# Patient Record
Sex: MALE | URBAN - METROPOLITAN AREA
[De-identification: names, ages, dates, MRNs, and addresses within clinical notes are randomized per-mention and may not be internally consistent; named-entity substitution may affect disease eponyms.]

---

## 2021-03-26 ENCOUNTER — OFFICE VISIT (OUTPATIENT)
Dept: URBAN - METROPOLITAN AREA CLINIC 16 | Facility: CLINIC | Age: 76
End: 2021-03-26
Payer: COMMERCIAL

## 2021-03-26 PROCEDURE — 99204 OFFICE O/P NEW MOD 45 MIN: CPT | Performed by: OPHTHALMOLOGY

## 2021-03-26 ASSESSMENT — KERATOMETRY
OS: 42.75
OD: 42.50

## 2021-03-26 ASSESSMENT — VISUAL ACUITY
OD: 20/40
OS: 20/30

## 2021-03-26 ASSESSMENT — INTRAOCULAR PRESSURE
OS: 12
OD: 12

## 2021-04-08 ENCOUNTER — TESTING ONLY (OUTPATIENT)
Dept: URBAN - METROPOLITAN AREA CLINIC 23 | Facility: CLINIC | Age: 76
End: 2021-04-08
Payer: COMMERCIAL

## 2021-04-08 DIAGNOSIS — H25.813 COMBINED FORMS OF AGE-RELATED CATARACT, BILATERAL: Primary | ICD-10-CM

## 2021-04-08 ASSESSMENT — PACHYMETRY
OS: 3.13
OD: 23.61
OS: 23.65
OD: 3.09

## 2021-05-03 ENCOUNTER — SURGERY (OUTPATIENT)
Dept: URBAN - METROPOLITAN AREA SURGERY 11 | Facility: SURGERY | Age: 76
End: 2021-05-03
Payer: COMMERCIAL

## 2021-05-03 PROCEDURE — 66984 XCAPSL CTRC RMVL W/O ECP: CPT | Performed by: OPHTHALMOLOGY

## 2021-05-04 ENCOUNTER — POST-OPERATIVE VISIT (OUTPATIENT)
Dept: URBAN - METROPOLITAN AREA CLINIC 16 | Facility: CLINIC | Age: 76
End: 2021-05-04
Payer: COMMERCIAL

## 2021-05-04 DIAGNOSIS — Z48.810 ENCOUNTER FOR SURGICAL AFTERCARE FOLLOWING SURGERY ON A SENSE ORGAN: Primary | ICD-10-CM

## 2021-05-04 ASSESSMENT — INTRAOCULAR PRESSURE
OD: 16
OS: 20

## 2021-05-04 NOTE — IMPRESSION/PLAN
Impression: S/P Cataract Extraction by phacoemulsification with IOL placement OS - 1 Day. Encounter for surgical aftercare following surgery on a sense organ  Z48.200.  Post operative instructions reviewed - Plan: --Continue Ofloxacin 0.3%--Taper Pred-Ketor as directed

## 2021-05-14 ENCOUNTER — OFFICE VISIT (OUTPATIENT)
Dept: URBAN - METROPOLITAN AREA CLINIC 16 | Facility: CLINIC | Age: 76
End: 2021-05-14
Payer: COMMERCIAL

## 2021-05-14 DIAGNOSIS — H25.811 COMBINED FORMS OF AGE-RELATED CATARACT, RIGHT EYE: Primary | ICD-10-CM

## 2021-05-14 DIAGNOSIS — Z96.1 PRESENCE OF INTRAOCULAR LENS: ICD-10-CM

## 2021-05-14 ASSESSMENT — VISUAL ACUITY
OS: 20/25
OD: 20/40

## 2021-05-14 ASSESSMENT — KERATOMETRY
OD: 42.38
OS: 42.25

## 2021-05-14 ASSESSMENT — INTRAOCULAR PRESSURE
OD: 14
OS: 14

## 2021-05-14 NOTE — IMPRESSION/PLAN
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 Condition: established, stable. Plan: d/c abx, taper steroid per plan. d/c shield. use tears.

## 2021-05-14 NOTE — IMPRESSION/PLAN
Impression: Combined forms of age-related cataract, right eye: H25.811. .  Visually significant, quality of life issue, could improve with surgery. Plan: Cataracts account for the patient's complaints. Discussed all risks, benefits, alternatives, procedures and recovery. Patient understands changing glasses will not improve vision. Patient desires to have surgery, recommend phacoemulsification with intraocular lens implant OD.  lvl 2 - pt wants Vivity IOL - AIM plano.

## 2021-05-17 ENCOUNTER — SURGERY (OUTPATIENT)
Dept: URBAN - METROPOLITAN AREA SURGERY 11 | Facility: SURGERY | Age: 76
End: 2021-05-17
Payer: COMMERCIAL

## 2021-05-17 PROCEDURE — 66984 XCAPSL CTRC RMVL W/O ECP: CPT | Performed by: OPHTHALMOLOGY

## 2021-05-18 ENCOUNTER — POST-OPERATIVE VISIT (OUTPATIENT)
Dept: URBAN - METROPOLITAN AREA CLINIC 16 | Facility: CLINIC | Age: 76
End: 2021-05-18
Payer: COMMERCIAL

## 2021-05-18 ASSESSMENT — INTRAOCULAR PRESSURE
OS: 16
OD: 16

## 2021-05-18 NOTE — IMPRESSION/PLAN
Impression: S/P Cataract Extraction by phacoemulsification with IOL placement OD - 1 Day. Presence of intraocular lens  Z96.1.  Plan: --Continue Ofloxacin 0.3%--Taper Pred-Ketor QID x 1 wk, TID x 1 wk, BID x 1wk, QD x 1wk, then d/c

## 2021-05-24 ENCOUNTER — POST-OPERATIVE VISIT (OUTPATIENT)
Dept: URBAN - METROPOLITAN AREA CLINIC 16 | Facility: CLINIC | Age: 76
End: 2021-05-24
Payer: COMMERCIAL

## 2021-05-24 ASSESSMENT — INTRAOCULAR PRESSURE
OD: 17
OS: 16

## 2021-05-24 NOTE — IMPRESSION/PLAN
Impression: S/P Cataract Extraction by phacoemulsification with IOL placement OD - 7 Days. Presence of intraocular lens  Z96.1.  Excellent post op course Plan: --Taper Pred-Ketor as directed--Discontinue Ocuflox

## 2021-06-22 ENCOUNTER — POST-OPERATIVE VISIT (OUTPATIENT)
Dept: URBAN - METROPOLITAN AREA CLINIC 16 | Facility: CLINIC | Age: 76
End: 2021-06-22
Payer: COMMERCIAL

## 2021-06-22 ASSESSMENT — INTRAOCULAR PRESSURE
OS: 15
OD: 14

## 2024-04-05 NOTE — IMPRESSION/PLAN
Impression: S/P Cataract Extraction by phacoemulsification with IOL placement OD - 36 Days. Presence of intraocular lens  Z96.1. Excellent post op course Plan: --Advised patient to use artificial tears for comfort. 4/17/24. The results of the evaluation and above recommendations were reviewed with Ran. He appeared to have full understanding of what was discussed.      Electronically signed by GISSEL HAIRSTON on 4/5/24 at 8:21 AM EDT